# Patient Record
Sex: FEMALE | Race: WHITE | NOT HISPANIC OR LATINO | Employment: FULL TIME | URBAN - METROPOLITAN AREA
[De-identification: names, ages, dates, MRNs, and addresses within clinical notes are randomized per-mention and may not be internally consistent; named-entity substitution may affect disease eponyms.]

---

## 2017-01-20 ENCOUNTER — ALLSCRIPTS OFFICE VISIT (OUTPATIENT)
Dept: OTHER | Facility: OTHER | Age: 22
End: 2017-01-20

## 2017-08-14 ENCOUNTER — TRANSCRIBE ORDERS (OUTPATIENT)
Dept: ADMINISTRATIVE | Facility: HOSPITAL | Age: 22
End: 2017-08-14

## 2017-08-14 ENCOUNTER — APPOINTMENT (OUTPATIENT)
Dept: LAB | Facility: HOSPITAL | Age: 22
End: 2017-08-14
Payer: COMMERCIAL

## 2017-08-14 DIAGNOSIS — Z00.8 HEALTH EXAMINATION IN POPULATION SURVEY: ICD-10-CM

## 2017-08-14 DIAGNOSIS — Z00.8 HEALTH EXAMINATION IN POPULATION SURVEY: Primary | ICD-10-CM

## 2017-08-14 LAB
CHOLEST SERPL-MCNC: 125 MG/DL (ref 50–200)
EST. AVERAGE GLUCOSE BLD GHB EST-MCNC: 91 MG/DL
HBA1C MFR BLD: 4.8 % (ref 4.2–6.3)
HDLC SERPL-MCNC: 74 MG/DL (ref 40–60)
LDLC SERPL CALC-MCNC: 47 MG/DL (ref 0–100)
TRIGL SERPL-MCNC: 22 MG/DL

## 2017-08-14 PROCEDURE — 36415 COLL VENOUS BLD VENIPUNCTURE: CPT

## 2017-08-14 PROCEDURE — 80061 LIPID PANEL: CPT

## 2017-08-14 PROCEDURE — 83036 HEMOGLOBIN GLYCOSYLATED A1C: CPT

## 2017-10-29 ENCOUNTER — APPOINTMENT (OUTPATIENT)
Dept: LAB | Facility: HOSPITAL | Age: 22
End: 2017-10-29
Payer: COMMERCIAL

## 2017-10-29 ENCOUNTER — TRANSCRIBE ORDERS (OUTPATIENT)
Dept: LAB | Facility: HOSPITAL | Age: 22
End: 2017-10-29

## 2017-10-29 DIAGNOSIS — L70.0 NODULAR ELASTOSIS WITH CYSTS AND COMEDONES OF FAVRE AND RACOUCHOT: ICD-10-CM

## 2017-10-29 DIAGNOSIS — Z79.899 NEED FOR PROPHYLACTIC CHEMOTHERAPY: Primary | ICD-10-CM

## 2017-10-29 DIAGNOSIS — L57.8 NODULAR ELASTOSIS WITH CYSTS AND COMEDONES OF FAVRE AND RACOUCHOT: ICD-10-CM

## 2017-10-29 DIAGNOSIS — Z79.899 NEED FOR PROPHYLACTIC CHEMOTHERAPY: ICD-10-CM

## 2017-10-29 LAB
ALBUMIN SERPL BCP-MCNC: 3.9 G/DL (ref 3.5–5)
ALP SERPL-CCNC: 53 U/L (ref 46–116)
ALT SERPL W P-5'-P-CCNC: 25 U/L (ref 12–78)
ANION GAP SERPL CALCULATED.3IONS-SCNC: 6 MMOL/L (ref 4–13)
AST SERPL W P-5'-P-CCNC: 15 U/L (ref 5–45)
B-HCG SERPL-ACNC: <2 MIU/ML
BILIRUB SERPL-MCNC: 0.69 MG/DL (ref 0.2–1)
BUN SERPL-MCNC: 10 MG/DL (ref 5–25)
CALCIUM SERPL-MCNC: 8.5 MG/DL (ref 8.3–10.1)
CHLORIDE SERPL-SCNC: 108 MMOL/L (ref 100–108)
CHOLEST SERPL-MCNC: 142 MG/DL (ref 50–200)
CO2 SERPL-SCNC: 26 MMOL/L (ref 21–32)
CREAT SERPL-MCNC: 0.7 MG/DL (ref 0.6–1.3)
ERYTHROCYTE [DISTWIDTH] IN BLOOD BY AUTOMATED COUNT: 13.1 % (ref 11.6–15.1)
GFR SERPL CREATININE-BSD FRML MDRD: 122 ML/MIN/1.73SQ M
GLUCOSE P FAST SERPL-MCNC: 75 MG/DL (ref 65–99)
HCT VFR BLD AUTO: 38.6 % (ref 34.8–46.1)
HDLC SERPL-MCNC: 78 MG/DL (ref 40–60)
HGB BLD-MCNC: 13 G/DL (ref 11.5–15.4)
LDLC SERPL CALC-MCNC: 53 MG/DL (ref 0–100)
MCH RBC QN AUTO: 27.8 PG (ref 26.8–34.3)
MCHC RBC AUTO-ENTMCNC: 33.7 G/DL (ref 31.4–37.4)
MCV RBC AUTO: 83 FL (ref 82–98)
PLATELET # BLD AUTO: 210 THOUSANDS/UL (ref 149–390)
PMV BLD AUTO: 11.1 FL (ref 8.9–12.7)
POTASSIUM SERPL-SCNC: 3.6 MMOL/L (ref 3.5–5.3)
PROT SERPL-MCNC: 7.1 G/DL (ref 6.4–8.2)
RBC # BLD AUTO: 4.68 MILLION/UL (ref 3.81–5.12)
SODIUM SERPL-SCNC: 140 MMOL/L (ref 136–145)
TRIGL SERPL-MCNC: 55 MG/DL
WBC # BLD AUTO: 7.09 THOUSAND/UL (ref 4.31–10.16)

## 2017-10-29 PROCEDURE — 36415 COLL VENOUS BLD VENIPUNCTURE: CPT

## 2017-10-29 PROCEDURE — 84702 CHORIONIC GONADOTROPIN TEST: CPT

## 2017-10-29 PROCEDURE — 80061 LIPID PANEL: CPT

## 2017-10-29 PROCEDURE — 80053 COMPREHEN METABOLIC PANEL: CPT

## 2017-10-29 PROCEDURE — 85027 COMPLETE CBC AUTOMATED: CPT

## 2017-11-07 ENCOUNTER — GENERIC CONVERSION - ENCOUNTER (OUTPATIENT)
Dept: OTHER | Facility: OTHER | Age: 22
End: 2017-11-07

## 2017-11-08 ENCOUNTER — ALLSCRIPTS OFFICE VISIT (OUTPATIENT)
Dept: OTHER | Facility: OTHER | Age: 22
End: 2017-11-08

## 2017-11-09 NOTE — PROGRESS NOTES
Assessment    1  Bacterial vaginosis (616 10,041 9) (N76 0,B96 89)    Plan  Bacterial vaginosis    · MetroNIDAZOLE 0 75 % Vaginal Gel; INSERT 1 APPLICATORFUL INTRAVAGINALLYAT BEDTIME NIGHTLY   Rx By: Lashon Vinson; Dispense: 5 Days ; #:1 X 70 GM Tube; Refill: 0;Bacterial vaginosis; YOHANA = N; Sent To: Michelle PEREZ    Discussion/Summary  Discussion Summary:   ST JACOBS was advised of the diagnosis of bacterial vaginosis and given an ACOG pamphlet with reference to this  She was informed that I will prescribed Metrogel and was given instructions for use  Chief Complaint  Chief Complaint Free Text Note Form: PATIENT C/O VAGINAL ITCHING AND BURNING X 3 DAYS  History of Present Illness  HPI: ST JACOBS has noted 3 days worth of vaginal itching, burning and discharge  She denies any external lesions, history of STDs, history of new hygiene products, or abnormal bleeding  Review of Systems  Focused-Female:  Constitutional: No fever, no chills, feels well, no tiredness, no recent weight gain or loss  ENT: no ear ache, no loss of hearing, no nosebleeds or nasal discharge, no sore throat or hoarseness  Cardiovascular: no complaints of slow or fast heart rate, no chest pain, no palpitations, no leg claudication or lower extremity edema  Respiratory: no complaints of shortness of breath, no wheezing, no dyspnea on exertion, no orthopnea or PND  Breasts: no complaints of breast pain, breast lump or nipple discharge  Gastrointestinal: no complaints of abdominal pain, no constipation, no nausea or diarrhea, no vomiting, no bloody stools  Genitourinary: no complaints of dysuria, no incontinence, no pelvic pain, no dysmenorrhea, no vaginal discharge or abnormal vaginal bleeding  Musculoskeletal: no complaints of arthralgia, no myalgia, no joint swelling or stiffness, no limb pain or swelling  Integumentary: no complaints of skin rash or lesion, no itching or dry skin, no skin wounds    Neurological: no complaints of headache, no confusion, no numbness or tingling, no dizziness or fainting  Active Problems    1  Contraception, generic surveillance (V25 40) (Z30 40)   2  Dizziness (780 4) (R42)   3  Encounter for annual routine gynecological examination (V72 31) (Z01 419)   4  Encounter for routine gynecological examination with Papanicolaou smear of cervix (V72 31,V76 2) (Z01 419)   5  Denied: History of self breast exam   6  Irregular menses (626 4) (N92 6)   7  Palpitations (785 1) (R00 2)   8  Secondary amenorrhea (626 0) (N91 1)   9  Vertigo (780 4) (R42)    Past Medical History  1  History of  0 (V49 89)   2  Denied: History of self breast exam   3  History of Palpitations (785 1) (R00 2)    Surgical History  1  History of Oral Surgery Tooth Extraction    Family History  Mother    1  Family history of Colon carcinoma  Father    2  Family history of    3  Family history of cerebral aneurysm (V17 1) (Z82 49)   4  Family history of hypertension (V17 49) (Z82 49)    Social History     · Always uses seat belt   · Caffeine use (V49 89) (F15 90)   · Has no children   · Never a smoker   · No drug use   · No Worship beliefs   · Single   · Social drinker (V49 89) (Z78 9)    Current Meds   1  Accutane 40 MG CAPS; Therapy: (Recorded:2017) to Recorded   2  Tri-Lo-Sprintec 0 18/0 215/0 25 MG-25 MCG Oral Tablet; TAKE 1 TABLET DAILY; Therapy: [id number] to ([phone])  Requested for: 09Mid3626; Last Rx:[id number] Ordered    Allergies  1  No Known Drug Allergies    Vitals  Vital Signs    Recorded:  20:88CR   Systolic 146, LUE, Sitting   Diastolic 64, LUE, Sitting   Height 5 ft 1 in   Weight 115 lb 8 oz   BMI Calculated 21 82   BSA Calculated 1 5   LMP 2017       Physical Exam   Constitutional  General appearance: No acute distress, well appearing and well nourished  Genitourinary  External genitalia: Normal and no lesions appreciated     Vagina: Abnormal   Vagina: moderate,-- foul smelling,-- brown-- and-- watery vaginal discharge  Urethra: Normal    Urethral meatus: Normal    Bladder: Normal, soft, non-tender and no prolapse or masses appreciated  Cervix: Normal, no palpable masses  Psychiatric  Orientation to person, place, and time: Normal    Mood and affect: Normal        Procedure  Wet mount shows multiple clue cells      Signatures   Electronically signed by :  Shawn Verma MD; Nov 8 2017 10:20AM EST                       (Author)

## 2017-11-28 ENCOUNTER — TRANSCRIBE ORDERS (OUTPATIENT)
Dept: LAB | Facility: HOSPITAL | Age: 22
End: 2017-11-28

## 2017-11-28 ENCOUNTER — APPOINTMENT (OUTPATIENT)
Dept: LAB | Facility: HOSPITAL | Age: 22
End: 2017-11-28
Payer: COMMERCIAL

## 2017-11-28 DIAGNOSIS — Z79.899 ENCOUNTER FOR LONG-TERM (CURRENT) USE OF MEDICATIONS: Primary | ICD-10-CM

## 2017-11-28 DIAGNOSIS — L70.0 ACNE VULGARIS: ICD-10-CM

## 2017-11-28 DIAGNOSIS — Z79.899 ENCOUNTER FOR LONG-TERM (CURRENT) USE OF MEDICATIONS: ICD-10-CM

## 2017-11-28 LAB
ALBUMIN SERPL BCP-MCNC: 3.8 G/DL (ref 3.5–5)
ALP SERPL-CCNC: 59 U/L (ref 46–116)
ALT SERPL W P-5'-P-CCNC: 27 U/L (ref 12–78)
ANION GAP SERPL CALCULATED.3IONS-SCNC: 6 MMOL/L (ref 4–13)
AST SERPL W P-5'-P-CCNC: 20 U/L (ref 5–45)
B-HCG SERPL-ACNC: <2 MIU/ML
BILIRUB SERPL-MCNC: 0.54 MG/DL (ref 0.2–1)
BUN SERPL-MCNC: 8 MG/DL (ref 5–25)
CALCIUM SERPL-MCNC: 8.7 MG/DL (ref 8.3–10.1)
CHLORIDE SERPL-SCNC: 109 MMOL/L (ref 100–108)
CHOLEST SERPL-MCNC: 170 MG/DL (ref 50–200)
CO2 SERPL-SCNC: 27 MMOL/L (ref 21–32)
CREAT SERPL-MCNC: 0.73 MG/DL (ref 0.6–1.3)
ERYTHROCYTE [DISTWIDTH] IN BLOOD BY AUTOMATED COUNT: 12.9 % (ref 11.6–15.1)
GFR SERPL CREATININE-BSD FRML MDRD: 116 ML/MIN/1.73SQ M
GLUCOSE P FAST SERPL-MCNC: 78 MG/DL (ref 65–99)
HCG SERPL QL: NEGATIVE
HCT VFR BLD AUTO: 39.7 % (ref 34.8–46.1)
HDLC SERPL-MCNC: 67 MG/DL (ref 40–60)
HGB BLD-MCNC: 13.2 G/DL (ref 11.5–15.4)
LDLC SERPL CALC-MCNC: 88 MG/DL (ref 0–100)
MCH RBC QN AUTO: 27.3 PG (ref 26.8–34.3)
MCHC RBC AUTO-ENTMCNC: 33.2 G/DL (ref 31.4–37.4)
MCV RBC AUTO: 82 FL (ref 82–98)
PLATELET # BLD AUTO: 222 THOUSANDS/UL (ref 149–390)
PMV BLD AUTO: 10.8 FL (ref 8.9–12.7)
POTASSIUM SERPL-SCNC: 3.8 MMOL/L (ref 3.5–5.3)
PROT SERPL-MCNC: 7.5 G/DL (ref 6.4–8.2)
RBC # BLD AUTO: 4.83 MILLION/UL (ref 3.81–5.12)
SODIUM SERPL-SCNC: 142 MMOL/L (ref 136–145)
TRIGL SERPL-MCNC: 77 MG/DL
WBC # BLD AUTO: 5.65 THOUSAND/UL (ref 4.31–10.16)

## 2017-11-28 PROCEDURE — 80053 COMPREHEN METABOLIC PANEL: CPT

## 2017-11-28 PROCEDURE — 80061 LIPID PANEL: CPT

## 2017-11-28 PROCEDURE — 84702 CHORIONIC GONADOTROPIN TEST: CPT

## 2017-11-28 PROCEDURE — 85027 COMPLETE CBC AUTOMATED: CPT

## 2017-11-28 PROCEDURE — 36415 COLL VENOUS BLD VENIPUNCTURE: CPT

## 2017-11-28 PROCEDURE — 84703 CHORIONIC GONADOTROPIN ASSAY: CPT

## 2017-12-29 ENCOUNTER — TRANSCRIBE ORDERS (OUTPATIENT)
Dept: LAB | Facility: HOSPITAL | Age: 22
End: 2017-12-29

## 2017-12-29 ENCOUNTER — APPOINTMENT (OUTPATIENT)
Dept: LAB | Facility: HOSPITAL | Age: 22
End: 2017-12-29
Payer: COMMERCIAL

## 2017-12-29 DIAGNOSIS — Z79.899 NEED FOR PROPHYLACTIC CHEMOTHERAPY: ICD-10-CM

## 2017-12-29 DIAGNOSIS — L70.0 NODULAR ELASTOSIS WITH CYSTS AND COMEDONES OF FAVRE AND RACOUCHOT: ICD-10-CM

## 2017-12-29 DIAGNOSIS — Z79.899 NEED FOR PROPHYLACTIC CHEMOTHERAPY: Primary | ICD-10-CM

## 2017-12-29 DIAGNOSIS — L57.8 NODULAR ELASTOSIS WITH CYSTS AND COMEDONES OF FAVRE AND RACOUCHOT: ICD-10-CM

## 2017-12-29 LAB
ALBUMIN SERPL BCP-MCNC: 3.7 G/DL (ref 3.5–5)
ALP SERPL-CCNC: 56 U/L (ref 46–116)
ALT SERPL W P-5'-P-CCNC: 22 U/L (ref 12–78)
ANION GAP SERPL CALCULATED.3IONS-SCNC: 7 MMOL/L (ref 4–13)
AST SERPL W P-5'-P-CCNC: 18 U/L (ref 5–45)
B-HCG SERPL-ACNC: <2 MIU/ML
BILIRUB SERPL-MCNC: 0.53 MG/DL (ref 0.2–1)
BUN SERPL-MCNC: 10 MG/DL (ref 5–25)
CALCIUM SERPL-MCNC: 8.7 MG/DL (ref 8.3–10.1)
CHLORIDE SERPL-SCNC: 107 MMOL/L (ref 100–108)
CHOLEST SERPL-MCNC: 161 MG/DL (ref 50–200)
CO2 SERPL-SCNC: 27 MMOL/L (ref 21–32)
CREAT SERPL-MCNC: 0.71 MG/DL (ref 0.6–1.3)
ERYTHROCYTE [DISTWIDTH] IN BLOOD BY AUTOMATED COUNT: 13.3 % (ref 11.6–15.1)
GFR SERPL CREATININE-BSD FRML MDRD: 120 ML/MIN/1.73SQ M
GLUCOSE P FAST SERPL-MCNC: 74 MG/DL (ref 65–99)
HCT VFR BLD AUTO: 37.6 % (ref 34.8–46.1)
HDLC SERPL-MCNC: 59 MG/DL (ref 40–60)
HGB BLD-MCNC: 12.7 G/DL (ref 11.5–15.4)
LDLC SERPL CALC-MCNC: 88 MG/DL (ref 0–100)
MCH RBC QN AUTO: 27.8 PG (ref 26.8–34.3)
MCHC RBC AUTO-ENTMCNC: 33.8 G/DL (ref 31.4–37.4)
MCV RBC AUTO: 82 FL (ref 82–98)
PLATELET # BLD AUTO: 225 THOUSANDS/UL (ref 149–390)
PMV BLD AUTO: 11 FL (ref 8.9–12.7)
POTASSIUM SERPL-SCNC: 3.5 MMOL/L (ref 3.5–5.3)
PROT SERPL-MCNC: 7.1 G/DL (ref 6.4–8.2)
RBC # BLD AUTO: 4.57 MILLION/UL (ref 3.81–5.12)
SODIUM SERPL-SCNC: 141 MMOL/L (ref 136–145)
TRIGL SERPL-MCNC: 70 MG/DL
WBC # BLD AUTO: 5.38 THOUSAND/UL (ref 4.31–10.16)

## 2017-12-29 PROCEDURE — 84702 CHORIONIC GONADOTROPIN TEST: CPT

## 2017-12-29 PROCEDURE — 85027 COMPLETE CBC AUTOMATED: CPT

## 2017-12-29 PROCEDURE — 80061 LIPID PANEL: CPT

## 2017-12-29 PROCEDURE — 36415 COLL VENOUS BLD VENIPUNCTURE: CPT

## 2017-12-29 PROCEDURE — 80053 COMPREHEN METABOLIC PANEL: CPT

## 2018-01-13 NOTE — RESULT NOTES
Message   Please notify patient that her holter monitor is normal and also get an update on how she is feeling  Verified Results  HOLTER MONITOR - 24 HOUR [id number] 10:39AM Juan Carlos Leary Order Number: [id number]    - Patient Instructions: To schedule this appointment, please contact Central Scheduling at [phone]  For Tushar Muniz, please call [phone]   Order Number: [id number]    - Patient Instructions: To schedule this appointment, please contact Central Scheduling at [phone]  For Tushar Muniz, please call [phone]  Test Name Result Flag Reference   HOLTER MONITOR - 24 HOUR (Report)     24 HOUR HOLTER MONITOR     INDICATION: R/O ARRHYTHMIA     Average heart rate: 83 bpm    Lowest heart rate: 47 bpm   Highest heart rate: 174 bpm     VENTRICULAR ECTOPY - 0 0% of all monitored beats   Total number: 0      SUPRAVENTRICULAR ECTOPY - 0 0% of all monitored beats   Total number: 20    Supraventricular Couplets: 4      BRADYCARDIA   Slowest episode: 6:32 am, (minimum heart rate of 47 bpm)  This corresponded with an ectopic atrial vs junctional bradycardia  There were multiple times the patient went in and out of this rhythm, presumably during sleeping hours  TACHYCARDIA   Fastest episode: occurred at 7:12 pm, (maximum heart rate of 174 bpm)  This corresponded with sinus tachycardia  SYMPTOMS   The patient experienced no significant symptoms during the monitoring time period  1) Normal Holter monitor of 24 hrs duration  2) Normal burden of ventricular and supraventricular ectopic beats  3) Ectopic atrial vs junctional bradycardia during presumed sleep hours  Interpreting Physician: Yuriy Gallo MD, [facility]

## 2018-01-14 VITALS
WEIGHT: 116.25 LBS | HEIGHT: 61 IN | SYSTOLIC BLOOD PRESSURE: 110 MMHG | DIASTOLIC BLOOD PRESSURE: 64 MMHG | BODY MASS INDEX: 21.95 KG/M2

## 2018-01-14 VITALS
DIASTOLIC BLOOD PRESSURE: 64 MMHG | SYSTOLIC BLOOD PRESSURE: 108 MMHG | HEIGHT: 61 IN | WEIGHT: 115.5 LBS | BODY MASS INDEX: 21.81 KG/M2

## 2018-01-17 NOTE — MISCELLANEOUS
Message   Date: 07 Nov 2017 4:03 PM EST, Recorded By: Chelsey Olivares For: Maria De Jesus Stovall   Caller: Katherine Gay, Self   Phone: [phone] (Home), [phone] (Work)   Reason: Medical Complaint   PT HAS A VAGINAL INFECTION  Rafi Jackson NEVER HAD ONE  Rafi Stanley PT WILL SCHEDULE APPT        Active Problems    1  Contraception, generic surveillance (V25 40) (Z30 40)   2  Dizziness (780 4) (R42)   3  Encounter for annual routine gynecological examination (V72 31) (Z01 419)   4  Encounter for routine gynecological examination with Papanicolaou smear of cervix   (V72 31,V76 2) (Z01 419)   5  Denied: History of self breast exam   6  Irregular menses (626 4) (N92 6)   7  Palpitations (785 1) (R00 2)   8  Secondary amenorrhea (626 0) (N91 1)   9  Vertigo (780 4) (R42)    Current Meds   1  Tri-Lo-Sprintec 0 18/0 215/0 25 MG-25 MCG Oral Tablet; TAKE 1 TABLET DAILY; Therapy: [id number] to (Bebo [place])  Requested for: 65Dam8179; Last   Rx:[id number] Ordered    Allergies    1   No Known Drug Allergies    Signatures   Electronically signed by : Damita Osler, ; Nov 7 2017  4:04PM EST                       (Author)

## 2018-01-17 NOTE — MISCELLANEOUS
Message   Recorded as Task   Date: 10/18/2016 03:46 PM, Created By: Sara Johnson   Task Name: Call Patient with results   Assigned To: Sara Johnson   Regarding Patient: Mikael Nassar, Status: Active   Comment: Sara Johnson - 18 Oct 2016 3:46 PM     Patient Phone: [phone]      Please notify patient that her holter monitor is normal and also get an update on how she is feeling  Seble Higuera - 18 Oct 2016 4:12 PM     TASK REASSIGNED: Previously Assigned To Iraj Hernandez - 18 Oct 2016 4:54 PM     TASK REASSIGNED: Previously Assigned To [address] [facility]      Left message   Kyra Shi - 20 Oct 2016 2:26 PM     TASK REPLIED TO: Previously Assigned To [address] [facility]  Patient says she does feel okay, not the best but doesn't feel as bad as before  Cholo Cisneros - 21 Oct 2016 9:04 AM     TASK REPLIED TO: Previously Assigned To Joselin Wilkinson, if she'd like to make an appt to f/u on symptoms please have her do so  Ciera Calix - 21 Oct 2016 3:34 PM     TASK REPLIED TO: Previously Assigned To [address] [facility]   Pt is feeling better  No more vertigo  She will call if she feels like she should come in  Right now she doesn't  Active Problems    1  Dizziness (780 4) (R42)   2  Encounter for contraceptive management (V25 9) (Z30 9)   3  Denied: History of self breast exam   4  Irregular menses (626 4) (N92 6)   5  Palpitations (785 1) (R00 2)   6  Secondary amenorrhea (626 0) (N91 1)    Current Meds   1  Tri-Lo-Sprintec 0 18/0 215/0 25 MG-25 MCG Oral Tablet; TABS PO X 84;   Therapy: 34IDR1090 to (Last Rx:[id number])  Requested for: 86Fsr6572 Ordered    Allergies    1  No Known Drug Allergies    Signatures   Electronically signed by :  Ronie Dancer, CRNP; Oct 21 2016  5:27PM EST                       (Author)

## 2018-01-29 ENCOUNTER — APPOINTMENT (OUTPATIENT)
Dept: LAB | Facility: HOSPITAL | Age: 23
End: 2018-01-29
Payer: COMMERCIAL

## 2018-01-29 ENCOUNTER — TRANSCRIBE ORDERS (OUTPATIENT)
Dept: LAB | Facility: HOSPITAL | Age: 23
End: 2018-01-29

## 2018-01-29 DIAGNOSIS — Z79.899 ENCOUNTER FOR LONG-TERM (CURRENT) USE OF MEDICATIONS: Primary | ICD-10-CM

## 2018-01-29 DIAGNOSIS — L70.0 ACNE VULGARIS: ICD-10-CM

## 2018-01-29 LAB
ALBUMIN SERPL BCP-MCNC: 3.4 G/DL (ref 3.5–5)
ALP SERPL-CCNC: 60 U/L (ref 46–116)
ALT SERPL W P-5'-P-CCNC: 37 U/L (ref 12–78)
ANION GAP SERPL CALCULATED.3IONS-SCNC: 7 MMOL/L (ref 4–13)
AST SERPL W P-5'-P-CCNC: 26 U/L (ref 5–45)
B-HCG SERPL-ACNC: <2 MIU/ML
BILIRUB SERPL-MCNC: 0.55 MG/DL (ref 0.2–1)
BUN SERPL-MCNC: 9 MG/DL (ref 5–25)
CALCIUM SERPL-MCNC: 8.3 MG/DL (ref 8.3–10.1)
CHLORIDE SERPL-SCNC: 108 MMOL/L (ref 100–108)
CHOLEST SERPL-MCNC: 143 MG/DL (ref 50–200)
CO2 SERPL-SCNC: 28 MMOL/L (ref 21–32)
CREAT SERPL-MCNC: 0.62 MG/DL (ref 0.6–1.3)
ERYTHROCYTE [DISTWIDTH] IN BLOOD BY AUTOMATED COUNT: 13.3 % (ref 11.6–15.1)
GFR SERPL CREATININE-BSD FRML MDRD: 127 ML/MIN/1.73SQ M
GLUCOSE P FAST SERPL-MCNC: 73 MG/DL (ref 65–99)
HCT VFR BLD AUTO: 38.5 % (ref 34.8–46.1)
HDLC SERPL-MCNC: 55 MG/DL (ref 40–60)
HGB BLD-MCNC: 13 G/DL (ref 11.5–15.4)
LDLC SERPL CALC-MCNC: 75 MG/DL (ref 0–100)
MCH RBC QN AUTO: 27.7 PG (ref 26.8–34.3)
MCHC RBC AUTO-ENTMCNC: 33.8 G/DL (ref 31.4–37.4)
MCV RBC AUTO: 82 FL (ref 82–98)
PLATELET # BLD AUTO: 276 THOUSANDS/UL (ref 149–390)
PMV BLD AUTO: 10.3 FL (ref 8.9–12.7)
POTASSIUM SERPL-SCNC: 3.6 MMOL/L (ref 3.5–5.3)
PROT SERPL-MCNC: 6.7 G/DL (ref 6.4–8.2)
RBC # BLD AUTO: 4.7 MILLION/UL (ref 3.81–5.12)
SODIUM SERPL-SCNC: 143 MMOL/L (ref 136–145)
TRIGL SERPL-MCNC: 63 MG/DL
WBC # BLD AUTO: 10.5 THOUSAND/UL (ref 4.31–10.16)

## 2018-01-29 PROCEDURE — 84702 CHORIONIC GONADOTROPIN TEST: CPT

## 2018-01-29 PROCEDURE — 36415 COLL VENOUS BLD VENIPUNCTURE: CPT

## 2018-01-29 PROCEDURE — 80053 COMPREHEN METABOLIC PANEL: CPT

## 2018-01-29 PROCEDURE — 80061 LIPID PANEL: CPT

## 2018-01-29 PROCEDURE — 85027 COMPLETE CBC AUTOMATED: CPT

## 2018-01-30 ENCOUNTER — LAB REQUISITION (OUTPATIENT)
Dept: LAB | Facility: HOSPITAL | Age: 23
End: 2018-01-30
Payer: COMMERCIAL

## 2018-01-30 ENCOUNTER — OFFICE VISIT (OUTPATIENT)
Dept: FAMILY MEDICINE CLINIC | Facility: HOSPITAL | Age: 23
End: 2018-01-30
Payer: COMMERCIAL

## 2018-01-30 VITALS
HEART RATE: 70 BPM | SYSTOLIC BLOOD PRESSURE: 120 MMHG | TEMPERATURE: 97 F | HEIGHT: 60 IN | DIASTOLIC BLOOD PRESSURE: 68 MMHG | WEIGHT: 109 LBS | BODY MASS INDEX: 21.4 KG/M2

## 2018-01-30 DIAGNOSIS — R50.9 FEVER AND CHILLS: ICD-10-CM

## 2018-01-30 DIAGNOSIS — R68.89 OTHER GENERAL SYMPTOMS AND SIGNS: ICD-10-CM

## 2018-01-30 DIAGNOSIS — R68.89 FLU-LIKE SYMPTOMS: ICD-10-CM

## 2018-01-30 DIAGNOSIS — J02.9 SORE THROAT: Primary | ICD-10-CM

## 2018-01-30 PROBLEM — L70.0 ACNE VULGARIS: Status: ACTIVE | Noted: 2018-01-30

## 2018-01-30 LAB — S PYO AG THROAT QL: NEGATIVE

## 2018-01-30 PROCEDURE — 99214 OFFICE O/P EST MOD 30 MIN: CPT | Performed by: INTERNAL MEDICINE

## 2018-01-30 PROCEDURE — 87880 STREP A ASSAY W/OPTIC: CPT | Performed by: INTERNAL MEDICINE

## 2018-01-30 PROCEDURE — 87070 CULTURE OTHR SPECIMN AEROBIC: CPT | Performed by: INTERNAL MEDICINE

## 2018-01-30 PROCEDURE — 87147 CULTURE TYPE IMMUNOLOGIC: CPT | Performed by: INTERNAL MEDICINE

## 2018-01-30 PROCEDURE — 87798 DETECT AGENT NOS DNA AMP: CPT | Performed by: INTERNAL MEDICINE

## 2018-01-30 RX ORDER — OSELTAMIVIR PHOSPHATE 75 MG/1
75 CAPSULE ORAL EVERY 12 HOURS SCHEDULED
Qty: 10 CAPSULE | Refills: 0 | Status: SHIPPED | OUTPATIENT
Start: 2018-01-30 | End: 2018-02-04

## 2018-01-30 RX ORDER — ISOTRETINOIN 40 MG/1
1 CAPSULE ORAL 2 TIMES DAILY
COMMUNITY
End: 2018-06-16

## 2018-01-30 RX ORDER — NORGESTIMATE AND ETHINYL ESTRADIOL 7DAYSX3 LO
1 KIT ORAL DAILY
COMMUNITY
Start: 2016-02-23 | End: 2018-02-22 | Stop reason: SDUPTHER

## 2018-01-30 NOTE — PROGRESS NOTES
Assessment/Plan:    No problem-specific Assessment & Plan notes found for this encounter  Diagnoses and all orders for this visit:    Sore throat  -     Throat culture; Future  -     Cancel: Rapid Strep A Screen Throat; Future  -     POCT rapid strepA    Fever and chills  -     Influenza A/B and RSV by PCR; Future    Flu-like symptoms  -     oseltamivir (TAMIFLU) 75 mg capsule; Take 1 capsule (75 mg total) by mouth every 12 (twelve) hours for 5 days  -     Influenza A/B and RSV by PCR; Future      Pt here with ST and flu like symptoms x 2 days - with fever and wide spread flu will start Tamiflu and send for influenza culture to confirm, rapid strep neg but will send for throat culture to ensure negative, no abx unless strep comes back +, gargles/hot tea/lozenges/rest/fluids encouraged, Advised to alternate btw Tylenol and Advil for fever and body aches, call with new/worsening symptoms    D/w pt that likely flu but d/t recent travel if not better or new symptoms occur she needs to call asap for further w/u - pt agreed to do so    Subjective:      Patient ID: Steven Acosta is a 23 y o  female  HPI  Pt here with 2 days of not feeling well  She felt fine  but woke up Monday am with ST  She started to get chills and checked her temp and it was 102 7  She started to feel body aches, Ha's, and nasal congestion (not new since on Accutane)  She notes no cough/ear pain  She was just in [facility] - in [facility] - volunteered in [address]  Did get sick a few days while in [facility] but thought it was d/t altitude  She did have some constipation but that resolved  She notes no urinary symptoms/D/V but she does feel nauseous  She denies rashes/dizziness  She 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 known sick contacts 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   She did get a flu shot  She did take Advil about 2 hrs ago - she did still have temp of 101 3 this am           Review of Systems   Constitutional: Positive for chills, fatigue and fever  HENT: Positive for congestion, rhinorrhea and sore throat  Negative for ear pain  Eyes: Negative for pain and redness  Respiratory: Negative for cough and shortness of breath  Cardiovascular: Negative for chest pain and palpitations  Gastrointestinal: Positive for nausea  Negative for abdominal pain and diarrhea  Genitourinary: Negative for dysuria  Musculoskeletal: Positive for myalgias  Skin: Negative for rash  Neurological: Positive for headaches  Negative for dizziness  Hematological: Does not bruise/bleed easily  Psychiatric/Behavioral: Negative for confusion  Objective:     Physical Exam   Constitutional: She appears well-developed and well-nourished  No distress  HENT:   Head: Normocephalic and atraumatic  Right Ear: External ear normal    Left Ear: External ear normal    Mouth/Throat: No oropharyngeal exudate  + pharyngeal erythema   Eyes: EOM are normal  Pupils are equal, round, and reactive to light  Right eye exhibits no discharge  Left eye exhibits no discharge  Neck: Neck supple  Cardiovascular: Normal rate, regular rhythm and normal heart sounds  Exam reveals no friction rub  No murmur heard  Pulmonary/Chest: Effort normal and breath sounds normal  No respiratory distress  She has no wheezes  She has no rales  Abdominal: Soft  She exhibits no distension  There is no tenderness  There is no guarding  Musculoskeletal: She exhibits no edema  Lymphadenopathy:     She has no cervical adenopathy  Neurological: She is alert  She exhibits normal muscle tone  Skin: Skin is warm  No rash noted  Psychiatric: She has a normal mood and affect   Her behavior is normal

## 2018-01-31 DIAGNOSIS — J02.0 STREP THROAT: Primary | ICD-10-CM

## 2018-01-31 LAB
BACTERIA THROAT CULT: ABNORMAL
FLUAV AG SPEC QL: NORMAL
FLUBV AG SPEC QL: NORMAL
RSV B RNA SPEC QL NAA+PROBE: NORMAL

## 2018-01-31 RX ORDER — AMOXICILLIN AND CLAVULANATE POTASSIUM 875; 125 MG/1; MG/1
1 TABLET, FILM COATED ORAL EVERY 12 HOURS SCHEDULED
Qty: 14 TABLET | Refills: 0 | Status: SHIPPED | OUTPATIENT
Start: 2018-01-31 | End: 2018-02-07

## 2018-02-22 ENCOUNTER — OFFICE VISIT (OUTPATIENT)
Dept: OBGYN CLINIC | Facility: CLINIC | Age: 23
End: 2018-02-22
Payer: COMMERCIAL

## 2018-02-22 VITALS
BODY MASS INDEX: 22.26 KG/M2 | WEIGHT: 113.4 LBS | DIASTOLIC BLOOD PRESSURE: 72 MMHG | SYSTOLIC BLOOD PRESSURE: 112 MMHG | HEIGHT: 60 IN

## 2018-02-22 DIAGNOSIS — Z01.419 ENCOUNTER FOR ANNUAL ROUTINE GYNECOLOGICAL EXAMINATION: ICD-10-CM

## 2018-02-22 DIAGNOSIS — Z12.4 SCREENING FOR CERVICAL CANCER: ICD-10-CM

## 2018-02-22 DIAGNOSIS — Z30.9 ENCOUNTER FOR CONTRACEPTIVE MANAGEMENT, UNSPECIFIED TYPE: ICD-10-CM

## 2018-02-22 DIAGNOSIS — B37.3 VAGINAL CANDIDIASIS: ICD-10-CM

## 2018-02-22 DIAGNOSIS — Z11.3 SCREENING FOR STDS (SEXUALLY TRANSMITTED DISEASES): Primary | ICD-10-CM

## 2018-02-22 DIAGNOSIS — Z11.51 SCREENING FOR HUMAN PAPILLOMAVIRUS (HPV): ICD-10-CM

## 2018-02-22 PROCEDURE — 99395 PREV VISIT EST AGE 18-39: CPT | Performed by: OBSTETRICS & GYNECOLOGY

## 2018-02-22 PROCEDURE — G0145 SCR C/V CYTO,THINLAYER,RESCR: HCPCS | Performed by: OBSTETRICS & GYNECOLOGY

## 2018-02-22 PROCEDURE — 87591 N.GONORRHOEAE DNA AMP PROB: CPT | Performed by: OBSTETRICS & GYNECOLOGY

## 2018-02-22 PROCEDURE — 87491 CHLMYD TRACH DNA AMP PROBE: CPT | Performed by: OBSTETRICS & GYNECOLOGY

## 2018-02-22 PROCEDURE — 87210 SMEAR WET MOUNT SALINE/INK: CPT | Performed by: OBSTETRICS & GYNECOLOGY

## 2018-02-22 RX ORDER — NORGESTIMATE AND ETHINYL ESTRADIOL 7DAYSX3 LO
1 KIT ORAL DAILY
Qty: 84 TABLET | Refills: 4 | Status: SHIPPED | OUTPATIENT
Start: 2018-02-22 | End: 2018-11-07 | Stop reason: SDUPTHER

## 2018-02-22 NOTE — PROGRESS NOTES
Assessment/Plan:    Pap done today  GC and chlamydia also done    Vaginal candidiasis-prescription for Terazol sent, no sign of BV    Contraception-her prescription was renewed, I very strongly encouraged her to be consistent with condom use especially due to the Accutane  She must be using 2 forms of contraception  She understands this  No problem-specific Assessment & Plan notes found for this encounter  Diagnoses and all orders for this visit:    Screening for STDs (sexually transmitted diseases)  -     Chlamydia/GC amplified DNA by PCR    Encounter for annual routine gynecological examination    Encounter for contraceptive management, unspecified type  -     norgestimate-ethinyl estradiol (TRI-LO-SPRINTEC) 0 18/0 215/0 25 MG-25 MCG per tablet; Take 1 tablet by mouth daily    Vaginal candidiasis  -     terconazole (TERAZOL 7) 0 4 % vaginal cream; Insert 1 applicator into the vagina daily at bedtime for 7 days    Screening for human papillomavirus (HPV)  -     Liquid-based pap, screening    Screening for cervical cancer  -     Liquid-based pap, screening          Subjective:      Patient ID: Rex Mehta is a 23 y o  female  Patient here for yearly, she was treated for BV in November  She is now having vaginal discharge and itching, no odor  She is doing well on her tri Lo Sprintec  No further breakthrough bleeding  She has a new partner  She uses condoms but is not always consistent  She is on Accutane for about 2 more months  Gynecologic Exam         The following portions of the patient's history were reviewed and updated as appropriate: allergies, current medications, past family history, past medical history, past social history, past surgical history and problem list     Review of Systems   All other systems reviewed and are negative          Objective:      /72 (BP Location: Right arm, Patient Position: Sitting, Cuff Size: Standard)   Ht 5' (1 524 m)   Wt 51 4 kg (113 lb 6 4 oz)   LMP 02/04/2018 (Exact Date)   BMI 22 15 kg/m²          Physical Exam   Constitutional: She appears well-developed  Neck: No tracheal deviation present  No thyromegaly present  Cardiovascular: Normal rate and regular rhythm  Pulmonary/Chest: Effort normal and breath sounds normal  Right breast exhibits no inverted nipple, no mass, no nipple discharge, no skin change and no tenderness  Left breast exhibits no inverted nipple, no mass, no nipple discharge, no skin change and no tenderness  Breasts are symmetrical    Examined seated and supine   Abdominal: Soft  She exhibits no distension and no mass  There is no tenderness  Genitourinary: Rectum normal, vagina normal and uterus normal  No labial fusion  There is no rash, tenderness, lesion or injury on the right labia  There is no rash, tenderness, lesion or injury on the left labia  Cervix exhibits no motion tenderness, no discharge and no friability  Right adnexum displays no mass, no tenderness and no fullness  Left adnexum displays no mass, no tenderness and no fullness  Genitourinary Comments: White discharge noted   Vitals reviewed

## 2018-02-27 LAB
CHLAMYDIA DNA CVX QL NAA+PROBE: NORMAL
N GONORRHOEA DNA GENITAL QL NAA+PROBE: NORMAL

## 2018-03-01 ENCOUNTER — TRANSCRIBE ORDERS (OUTPATIENT)
Dept: LAB | Facility: HOSPITAL | Age: 23
End: 2018-03-01

## 2018-03-01 ENCOUNTER — APPOINTMENT (OUTPATIENT)
Dept: LAB | Facility: HOSPITAL | Age: 23
End: 2018-03-01
Payer: COMMERCIAL

## 2018-03-01 DIAGNOSIS — L57.8 NODULAR ELASTOSIS WITH CYSTS AND COMEDONES OF FAVRE AND RACOUCHOT: ICD-10-CM

## 2018-03-01 DIAGNOSIS — Z79.899 NEED FOR PROPHYLACTIC CHEMOTHERAPY: ICD-10-CM

## 2018-03-01 DIAGNOSIS — Z79.899 NEED FOR PROPHYLACTIC CHEMOTHERAPY: Primary | ICD-10-CM

## 2018-03-01 DIAGNOSIS — L70.0 NODULAR ELASTOSIS WITH CYSTS AND COMEDONES OF FAVRE AND RACOUCHOT: ICD-10-CM

## 2018-03-01 LAB
ALBUMIN SERPL BCP-MCNC: 3.6 G/DL (ref 3.5–5)
ALP SERPL-CCNC: 64 U/L (ref 46–116)
ALT SERPL W P-5'-P-CCNC: 16 U/L (ref 12–78)
ANION GAP SERPL CALCULATED.3IONS-SCNC: 7 MMOL/L (ref 4–13)
AST SERPL W P-5'-P-CCNC: 14 U/L (ref 5–45)
B-HCG SERPL-ACNC: <2 MIU/ML
BILIRUB SERPL-MCNC: 0.51 MG/DL (ref 0.2–1)
BUN SERPL-MCNC: 6 MG/DL (ref 5–25)
CALCIUM SERPL-MCNC: 8.7 MG/DL (ref 8.3–10.1)
CHLORIDE SERPL-SCNC: 108 MMOL/L (ref 100–108)
CHOLEST SERPL-MCNC: 161 MG/DL (ref 50–200)
CO2 SERPL-SCNC: 26 MMOL/L (ref 21–32)
CREAT SERPL-MCNC: 0.7 MG/DL (ref 0.6–1.3)
ERYTHROCYTE [DISTWIDTH] IN BLOOD BY AUTOMATED COUNT: 13.9 % (ref 11.6–15.1)
GFR SERPL CREATININE-BSD FRML MDRD: 122 ML/MIN/1.73SQ M
GLUCOSE P FAST SERPL-MCNC: 80 MG/DL (ref 65–99)
HCG SERPL QL: NEGATIVE
HCT VFR BLD AUTO: 37.4 % (ref 34.8–46.1)
HDLC SERPL-MCNC: 65 MG/DL (ref 40–60)
HGB BLD-MCNC: 12.5 G/DL (ref 11.5–15.4)
LDLC SERPL CALC-MCNC: 83 MG/DL (ref 0–100)
MCH RBC QN AUTO: 27.4 PG (ref 26.8–34.3)
MCHC RBC AUTO-ENTMCNC: 33.4 G/DL (ref 31.4–37.4)
MCV RBC AUTO: 82 FL (ref 82–98)
PLATELET # BLD AUTO: 222 THOUSANDS/UL (ref 149–390)
PMV BLD AUTO: 10.6 FL (ref 8.9–12.7)
POTASSIUM SERPL-SCNC: 3.5 MMOL/L (ref 3.5–5.3)
PROT SERPL-MCNC: 7.1 G/DL (ref 6.4–8.2)
RBC # BLD AUTO: 4.56 MILLION/UL (ref 3.81–5.12)
SODIUM SERPL-SCNC: 141 MMOL/L (ref 136–145)
TRIGL SERPL-MCNC: 65 MG/DL
WBC # BLD AUTO: 6.4 THOUSAND/UL (ref 4.31–10.16)

## 2018-03-01 PROCEDURE — 36415 COLL VENOUS BLD VENIPUNCTURE: CPT

## 2018-03-01 PROCEDURE — 80061 LIPID PANEL: CPT

## 2018-03-01 PROCEDURE — 85027 COMPLETE CBC AUTOMATED: CPT

## 2018-03-01 PROCEDURE — 84702 CHORIONIC GONADOTROPIN TEST: CPT

## 2018-03-01 PROCEDURE — 80053 COMPREHEN METABOLIC PANEL: CPT

## 2018-03-01 PROCEDURE — 84703 CHORIONIC GONADOTROPIN ASSAY: CPT

## 2018-03-02 LAB
LAB AP GYN PRIMARY INTERPRETATION: NORMAL
Lab: NORMAL
PATH INTERP SPEC-IMP: NORMAL

## 2018-03-05 ENCOUNTER — TELEPHONE (OUTPATIENT)
Dept: OBGYN CLINIC | Facility: CLINIC | Age: 23
End: 2018-03-05

## 2018-03-05 NOTE — TELEPHONE ENCOUNTER
----- Message from Tanika Aldana DO sent at 3/2/2018 12:26 PM EST -----  Normal pap, negative chlamydia and gonorrhea, there was yeast on her pap but she was treated with Terazol so no further treatment needed

## 2018-04-02 ENCOUNTER — APPOINTMENT (OUTPATIENT)
Dept: LAB | Facility: HOSPITAL | Age: 23
End: 2018-04-02
Payer: COMMERCIAL

## 2018-04-02 ENCOUNTER — TRANSCRIBE ORDERS (OUTPATIENT)
Dept: LAB | Facility: HOSPITAL | Age: 23
End: 2018-04-02

## 2018-04-02 DIAGNOSIS — L70.0 NODULAR ELASTOSIS WITH CYSTS AND COMEDONES OF FAVRE AND RACOUCHOT: ICD-10-CM

## 2018-04-02 DIAGNOSIS — Z79.899 NEED FOR PROPHYLACTIC CHEMOTHERAPY: ICD-10-CM

## 2018-04-02 DIAGNOSIS — Z79.899 NEED FOR PROPHYLACTIC CHEMOTHERAPY: Primary | ICD-10-CM

## 2018-04-02 DIAGNOSIS — L57.8 NODULAR ELASTOSIS WITH CYSTS AND COMEDONES OF FAVRE AND RACOUCHOT: ICD-10-CM

## 2018-04-02 LAB
ALBUMIN SERPL BCP-MCNC: 3.5 G/DL (ref 3.5–5)
ALP SERPL-CCNC: 62 U/L (ref 46–116)
ALT SERPL W P-5'-P-CCNC: 15 U/L (ref 12–78)
ANION GAP SERPL CALCULATED.3IONS-SCNC: 6 MMOL/L (ref 4–13)
AST SERPL W P-5'-P-CCNC: 21 U/L (ref 5–45)
B-HCG SERPL-ACNC: <2 MIU/ML
BASOPHILS # BLD AUTO: 0.05 THOUSANDS/ΜL (ref 0–0.1)
BASOPHILS NFR BLD AUTO: 1 % (ref 0–1)
BILIRUB SERPL-MCNC: 0.48 MG/DL (ref 0.2–1)
BUN SERPL-MCNC: 12 MG/DL (ref 5–25)
CALCIUM SERPL-MCNC: 8.3 MG/DL (ref 8.3–10.1)
CHLORIDE SERPL-SCNC: 110 MMOL/L (ref 100–108)
CHOLEST SERPL-MCNC: 184 MG/DL (ref 50–200)
CO2 SERPL-SCNC: 26 MMOL/L (ref 21–32)
CREAT SERPL-MCNC: 0.82 MG/DL (ref 0.6–1.3)
EOSINOPHIL # BLD AUTO: 0.16 THOUSAND/ΜL (ref 0–0.61)
EOSINOPHIL NFR BLD AUTO: 3 % (ref 0–6)
ERYTHROCYTE [DISTWIDTH] IN BLOOD BY AUTOMATED COUNT: 13.8 % (ref 11.6–15.1)
GFR SERPL CREATININE-BSD FRML MDRD: 100 ML/MIN/1.73SQ M
GLUCOSE P FAST SERPL-MCNC: 75 MG/DL (ref 65–99)
HCG SERPL QL: NEGATIVE
HCT VFR BLD AUTO: 40 % (ref 34.8–46.1)
HDLC SERPL-MCNC: 75 MG/DL (ref 40–60)
HGB BLD-MCNC: 13.3 G/DL (ref 11.5–15.4)
LDLC SERPL CALC-MCNC: 94 MG/DL (ref 0–100)
LYMPHOCYTES # BLD AUTO: 2.79 THOUSANDS/ΜL (ref 0.6–4.47)
LYMPHOCYTES NFR BLD AUTO: 45 % (ref 14–44)
MCH RBC QN AUTO: 27.5 PG (ref 26.8–34.3)
MCHC RBC AUTO-ENTMCNC: 33.3 G/DL (ref 31.4–37.4)
MCV RBC AUTO: 83 FL (ref 82–98)
MONOCYTES # BLD AUTO: 0.3 THOUSAND/ΜL (ref 0.17–1.22)
MONOCYTES NFR BLD AUTO: 5 % (ref 4–12)
NEUTROPHILS # BLD AUTO: 2.95 THOUSANDS/ΜL (ref 1.85–7.62)
NEUTS SEG NFR BLD AUTO: 46 % (ref 43–75)
NRBC BLD AUTO-RTO: 0 /100 WBCS
PLATELET # BLD AUTO: 236 THOUSANDS/UL (ref 149–390)
PMV BLD AUTO: 10.6 FL (ref 8.9–12.7)
POTASSIUM SERPL-SCNC: 3.7 MMOL/L (ref 3.5–5.3)
PROT SERPL-MCNC: 7.2 G/DL (ref 6.4–8.2)
RBC # BLD AUTO: 4.83 MILLION/UL (ref 3.81–5.12)
SODIUM SERPL-SCNC: 142 MMOL/L (ref 136–145)
TRIGL SERPL-MCNC: 74 MG/DL
WBC # BLD AUTO: 6.27 THOUSAND/UL (ref 4.31–10.16)

## 2018-04-02 PROCEDURE — 80053 COMPREHEN METABOLIC PANEL: CPT

## 2018-04-02 PROCEDURE — 36415 COLL VENOUS BLD VENIPUNCTURE: CPT

## 2018-04-02 PROCEDURE — 84702 CHORIONIC GONADOTROPIN TEST: CPT

## 2018-04-02 PROCEDURE — 85025 COMPLETE CBC W/AUTO DIFF WBC: CPT

## 2018-04-02 PROCEDURE — 84703 CHORIONIC GONADOTROPIN ASSAY: CPT

## 2018-04-02 PROCEDURE — 80061 LIPID PANEL: CPT

## 2018-04-05 ENCOUNTER — OFFICE VISIT (OUTPATIENT)
Dept: URGENT CARE | Facility: MEDICAL CENTER | Age: 23
End: 2018-04-05
Payer: COMMERCIAL

## 2018-04-05 VITALS
DIASTOLIC BLOOD PRESSURE: 82 MMHG | HEART RATE: 68 BPM | TEMPERATURE: 97.1 F | WEIGHT: 113.8 LBS | HEIGHT: 60 IN | SYSTOLIC BLOOD PRESSURE: 110 MMHG | BODY MASS INDEX: 22.34 KG/M2 | RESPIRATION RATE: 16 BRPM | OXYGEN SATURATION: 99 %

## 2018-04-05 DIAGNOSIS — S76.011A STRAIN OF FLEXOR MUSCLE OF RIGHT HIP, INITIAL ENCOUNTER: Primary | ICD-10-CM

## 2018-04-05 PROCEDURE — 99213 OFFICE O/P EST LOW 20 MIN: CPT | Performed by: FAMILY MEDICINE

## 2018-04-05 PROCEDURE — S9088 SERVICES PROVIDED IN URGENT: HCPCS | Performed by: FAMILY MEDICINE

## 2018-04-05 NOTE — PATIENT INSTRUCTIONS
History and physical suggest right hip flexor strain  Patient given reassurance  I advised to apply he compress as often as needed, continue with NSAIDs such as ibuprofen as needed  She is to perform light stretching exercises  And placing the patient on some modify restriction  If however, symptoms persist beyond 1 week, follow up with primary care provider  She expressed understanding  Hip Sprain   WHAT YOU NEED TO KNOW:   A hip sprain is when a ligament in your hip is stretched or torn  Ligaments (tough tissue that connects bones) surround your hip and hold it in place  DISCHARGE INSTRUCTIONS:   Medicines:   · NSAIDs  help decrease swelling, pain, and fever  This medicine is available with or without a doctor's order  NSAIDs can cause stomach bleeding or kidney problems in certain people  If you take blood thinner medicine, always ask your healthcare provider if NSAIDs are safe for you  Always read the medicine label and follow directions  · Take your medicine as directed  Contact your healthcare provider if you think your medicine is not helping or if you have side effects  Tell him if you are allergic to any medicine  Keep a list of the medicines, vitamins, and herbs you take  Include the amounts, and when and why you take them  Bring the list or the pill bottles to follow-up visits  Carry your medicine list with you in case of an emergency  Follow up with your healthcare provider as directed:  Write down any questions you have so you remember to ask them in your follow-up visits  How to care for your hip sprain:   · Rest  your hip for 2 to 3 days after your injury  This will help decrease the risk of more damage to your hip  · Apply ice  on your hip for 15 to 20 minutes every hour or as directed  Use an ice pack, or put crushed ice in a plastic bag  Cover it with a towel  Ice helps prevent tissue damage and decreases swelling and pain   You may need to apply ice to your hip at least 4 to 8 times each day for the first 48 hours  How to exercise your hip:  After you rest your hip for about 3 days, your healthcare provider may want you to exercise the hip to decrease stiffness  He may also have you start physical therapy (PT)  A physical therapist teaches you light exercises to help decrease pain and swelling and improve hip movement  Once you are able to move your hip without pain, you will be taught exercises to improve your strength  If you have a severe sprain, you may need to wait 1 to 3 weeks before you exercise your hip  How to prevent another injury:  Ask your healthcare provider when you can return to your normal activities  The following can help decrease your risk for sprains:  · Do not exercise when you feel pain or you are tired  · Eat healthy foods  This can help keep your muscles strong  Ask about the best meal plan for you  · Exercise daily  Make sure you warm up and stretch before you exercise  · Wear equipment to protect yourself when you play sports  · Wear shoes that fit well to decrease your risk for falls  · Stop exercising and playing sports if your symptoms from a past injury return  Contact your healthcare provider if:   · You cannot stand on the leg on your injured side  · You have new or increased stiffness or trouble moving your injured hip  · You have new or increased numbness in the leg on your injured side  · You have increased swelling and pain in your hip  · The leg on your injured side looks bluish or pale  · You have questions or concerns about your condition or care  © 2017 [address] [employer] Information is for End User's use only and may not be sold, redistributed or otherwise used for commercial purposes  All illustrations and images included in CareNotes® are the copyrighted property of [employer]  or [address]  The above information is an  only   It is not intended as medical advice for individual conditions or treatments  Talk to your doctor, nurse or pharmacist before following any medical regimen to see if it is safe and effective for you

## 2018-04-05 NOTE — LETTER
April 5, 2018     Patient: Yazan Justice   YOB: 1995   Date of Visit: 4/5/2018       To Whom It May Concern: It is my medical opinion that [place] may return to light duty immediately with the following restrictions: 4/5/2018  Avoid climbing and going downstairs for one-week on this date  Avoid squatting for 1 week  If you have any questions or concerns, please don't hesitate to call           Sincerely,        Siri Goldmann, MD    CC: No Recipients

## 2018-05-03 ENCOUNTER — TELEPHONE (OUTPATIENT)
Dept: OBGYN CLINIC | Facility: HOSPITAL | Age: 23
End: 2018-05-03

## 2018-05-29 DIAGNOSIS — Z30.41 ENCOUNTER FOR SURVEILLANCE OF CONTRACEPTIVE PILLS: Primary | ICD-10-CM

## 2018-05-31 RX ORDER — NORGESTIMATE AND ETHINYL ESTRADIOL 7DAYSX3 LO
1 KIT ORAL DAILY
Qty: 84 TABLET | Refills: 0 | Status: SHIPPED | OUTPATIENT
Start: 2018-05-31 | End: 2018-06-16

## 2018-06-16 ENCOUNTER — HOSPITAL ENCOUNTER (OUTPATIENT)
Dept: RADIOLOGY | Facility: HOSPITAL | Age: 23
Discharge: HOME | End: 2018-06-16
Payer: COMMERCIAL

## 2018-06-16 ENCOUNTER — OFFICE VISIT (OUTPATIENT)
Dept: FAMILY MEDICINE CLINIC | Facility: HOSPITAL | Age: 23
End: 2018-06-16
Payer: COMMERCIAL

## 2018-06-16 VITALS
DIASTOLIC BLOOD PRESSURE: 82 MMHG | BODY MASS INDEX: 22.89 KG/M2 | WEIGHT: 116.6 LBS | HEART RATE: 70 BPM | TEMPERATURE: 98 F | OXYGEN SATURATION: 98 % | RESPIRATION RATE: 12 BRPM | SYSTOLIC BLOOD PRESSURE: 128 MMHG | HEIGHT: 60 IN

## 2018-06-16 DIAGNOSIS — R39.9 UTI SYMPTOMS: ICD-10-CM

## 2018-06-16 DIAGNOSIS — R07.9 CHEST PAIN, UNSPECIFIED TYPE: ICD-10-CM

## 2018-06-16 DIAGNOSIS — R06.02 SOB (SHORTNESS OF BREATH): ICD-10-CM

## 2018-06-16 DIAGNOSIS — R35.0 URINARY FREQUENCY: Primary | ICD-10-CM

## 2018-06-16 LAB
BACTERIA UR QL AUTO: ABNORMAL /HPF
BILIRUB UR QL STRIP: NEGATIVE
CLARITY UR: CLEAR
COLOR UR: YELLOW
GLUCOSE UR STRIP-MCNC: NEGATIVE MG/DL
HGB UR QL STRIP.AUTO: NEGATIVE
HYALINE CASTS #/AREA URNS LPF: ABNORMAL /LPF
KETONES UR STRIP-MCNC: NEGATIVE MG/DL
LEUKOCYTE ESTERASE UR QL STRIP: ABNORMAL
NITRITE UR QL STRIP: NEGATIVE
NON-SQ EPI CELLS URNS QL MICRO: ABNORMAL /HPF
PH UR STRIP.AUTO: 6 [PH] (ref 4.5–8)
PROT UR STRIP-MCNC: NEGATIVE MG/DL
RBC #/AREA URNS AUTO: ABNORMAL /HPF
SL AMB  POCT GLUCOSE, UA: NORMAL
SL AMB LEUKOCYTE ESTERASE,UA: ABNORMAL
SL AMB POCT BILIRUBIN,UA: NEGATIVE
SL AMB POCT BLOOD,UA: ABNORMAL
SL AMB POCT CLARITY,UA: CLEAR
SL AMB POCT COLOR,UA: YELLOW
SL AMB POCT KETONES,UA: NEGATIVE
SL AMB POCT NITRITE,UA: NEGATIVE
SL AMB POCT PH,UA: 5
SL AMB POCT SPECIFIC GRAVITY,UA: 1.01
SL AMB POCT URINE PROTEIN: NEGATIVE
SL AMB POCT UROBILINOGEN: NORMAL
SP GR UR STRIP.AUTO: 1.01 (ref 1–1.03)
UROBILINOGEN UR QL STRIP.AUTO: 0.2 E.U./DL
WBC #/AREA URNS AUTO: ABNORMAL /HPF

## 2018-06-16 PROCEDURE — 93000 ELECTROCARDIOGRAM COMPLETE: CPT | Performed by: NURSE PRACTITIONER

## 2018-06-16 PROCEDURE — 99214 OFFICE O/P EST MOD 30 MIN: CPT | Performed by: NURSE PRACTITIONER

## 2018-06-16 PROCEDURE — 87186 SC STD MICRODIL/AGAR DIL: CPT | Performed by: NURSE PRACTITIONER

## 2018-06-16 PROCEDURE — 81002 URINALYSIS NONAUTO W/O SCOPE: CPT | Performed by: NURSE PRACTITIONER

## 2018-06-16 PROCEDURE — 87086 URINE CULTURE/COLONY COUNT: CPT | Performed by: NURSE PRACTITIONER

## 2018-06-16 PROCEDURE — 3008F BODY MASS INDEX DOCD: CPT | Performed by: NURSE PRACTITIONER

## 2018-06-16 PROCEDURE — 71046 X-RAY EXAM CHEST 2 VIEWS: CPT

## 2018-06-16 PROCEDURE — 87077 CULTURE AEROBIC IDENTIFY: CPT | Performed by: NURSE PRACTITIONER

## 2018-06-16 PROCEDURE — 81001 URINALYSIS AUTO W/SCOPE: CPT | Performed by: NURSE PRACTITIONER

## 2018-06-16 RX ORDER — NITROFURANTOIN 25; 75 MG/1; MG/1
100 CAPSULE ORAL 2 TIMES DAILY
Qty: 10 CAPSULE | Refills: 0 | Status: SHIPPED | OUTPATIENT
Start: 2018-06-16 | End: 2018-06-18 | Stop reason: ALTCHOICE

## 2018-06-16 NOTE — PROGRESS NOTES
Assessment/Plan:     Urine dip positive so will treat presumptively for UTI  Will send urine for UA with reflex to C/S  Instructed to call with no improvement or worsening symptoms  Sob with chest pain for the last 3 months  Low probability for PE    ECG normal in office  '  Will check chest xray and echo  Instructed to go to ED with worsening symptoms  Diagnoses and all orders for this visit:    Urinary frequency  -     Urine culture  -     UA (URINE) with reflex to Microscopic  -     POCT urine dip    UTI symptoms  -     nitrofurantoin (MACROBID) 100 mg capsule; Take 1 capsule (100 mg total) by mouth 2 (two) times a day    SOB (shortness of breath)  -     POCT ECG  -     XR chest pa & lateral; Future  -     Echo complete with contrast if indicated; Future    Chest pain, unspecified type  -     POCT ECG  -     XR chest pa & lateral; Future  -     Echo complete with contrast if indicated; Future          Subjective:     Patient ID: Nasrin Mirza is a 23 y o  female  Burning with urination at end of stream  Last 2 days with increased frequency but that has improved  Has been using clear tract powder that is helping  No blood in urine  Denies fever,chills, abd pain or pelvic pain or pressure  Has back pain that is not any worse than normal  Denies vaginal pain, itching, d/c  Sexually active in monogamous relationship  Denies chance of STD  Was on Accutane  Had sob at that time with nasal congestion  Stopped Accutane  2 months ago  Sob with talking and sometimes at rest  Seems to improve when running  Feels her lungs are more open  Has been going on for the 3 months  Nasal congestion has improved since stopping Accutane but sob persists  Will get pain in chest and back  On OCP  No leg pain or swelling  No cough  Denies h/o lung issues or asthma  Denies personal and family h/o blood clots or clotting issues  Denies h/o cancer, recent immobilization           Review of Systems   Constitutional: Negative for chills, fatigue and fever  Respiratory: Positive for shortness of breath  Negative for cough and wheezing  Cardiovascular: Positive for chest pain  Negative for palpitations and leg swelling  Gastrointestinal: Negative for abdominal pain  Genitourinary: Positive for frequency  Negative for decreased urine volume, difficulty urinating, flank pain, hematuria, pelvic pain, urgency, vaginal discharge and vaginal pain  Musculoskeletal: Negative for back pain  The following portions of the patient's history were reviewed and updated as appropriate: allergies, current medications, past family history, past medical history, past social history, past surgical history and problem list     Objective:  Vitals:    06/16/18 0757   BP: 128/82   Pulse: 70   Resp: 12   Temp: 98 °F (36 7 °C)   SpO2: 98%      Physical Exam   Constitutional: She is oriented to person, place, and time  She appears well-developed and well-nourished  Cardiovascular: Normal rate, regular rhythm and normal heart sounds  Pulmonary/Chest: Effort normal and breath sounds normal    Abdominal: Soft  Bowel sounds are normal  There is hepatosplenomegaly  There is no tenderness  Musculoskeletal: She exhibits no edema  Neurological: She is alert and oriented to person, place, and time  Skin: Skin is warm and dry  Psychiatric: She has a normal mood and affect

## 2018-06-18 DIAGNOSIS — N30.00 ACUTE CYSTITIS WITHOUT HEMATURIA: Primary | ICD-10-CM

## 2018-06-18 LAB — BACTERIA UR CULT: ABNORMAL

## 2018-06-18 RX ORDER — SULFAMETHOXAZOLE AND TRIMETHOPRIM 800; 160 MG/1; MG/1
1 TABLET ORAL EVERY 12 HOURS SCHEDULED
Qty: 10 TABLET | Refills: 0 | Status: SHIPPED | OUTPATIENT
Start: 2018-06-18 | End: 2018-06-23

## 2018-07-19 ENCOUNTER — OFFICE VISIT (OUTPATIENT)
Dept: OBGYN CLINIC | Facility: CLINIC | Age: 23
End: 2018-07-19
Payer: COMMERCIAL

## 2018-07-19 VITALS — WEIGHT: 116.6 LBS | DIASTOLIC BLOOD PRESSURE: 76 MMHG | SYSTOLIC BLOOD PRESSURE: 118 MMHG | BODY MASS INDEX: 22.77 KG/M2

## 2018-07-19 DIAGNOSIS — N72 CERVICITIS: Primary | ICD-10-CM

## 2018-07-19 DIAGNOSIS — Z11.3 SCREENING FOR STD (SEXUALLY TRANSMITTED DISEASE): ICD-10-CM

## 2018-07-19 PROCEDURE — 87591 N.GONORRHOEAE DNA AMP PROB: CPT | Performed by: OBSTETRICS & GYNECOLOGY

## 2018-07-19 PROCEDURE — 99213 OFFICE O/P EST LOW 20 MIN: CPT | Performed by: OBSTETRICS & GYNECOLOGY

## 2018-07-19 PROCEDURE — 87491 CHLMYD TRACH DNA AMP PROBE: CPT | Performed by: OBSTETRICS & GYNECOLOGY

## 2018-07-19 PROCEDURE — 87210 SMEAR WET MOUNT SALINE/INK: CPT | Performed by: OBSTETRICS & GYNECOLOGY

## 2018-07-19 RX ORDER — AZITHROMYCIN 500 MG/1
TABLET, FILM COATED ORAL
Qty: 2 TABLET | Refills: 0 | Status: SHIPPED | OUTPATIENT
Start: 2018-07-19 | End: 2018-07-19

## 2018-07-19 NOTE — PROGRESS NOTES
CC:  Discharge and concern for STDs    HPI: Marisol Morel presents for a several day history of a thick whitish discharge which also causes itching and burning  The patient denies any fever, chills, nausea or vomiting, pelvic pain, abnormal bleeding, dysuria or hematuria, or external lesions  The patient is also requesting a test for gonorrhea and Chlamydia and she recently had a new partner and had some concerns  Past Medical History:  Past Medical History:   Diagnosis Date    Palpitations     last assessed: 7/20/15       Past Surgical History:  Past Surgical History:   Procedure Laterality Date    TOOTH EXTRACTION         Past OB/Gyn History:  Menstrual cycles regular    ALLERGIES: No Known Allergies    MEDS:   Current Outpatient Prescriptions:     norgestimate-ethinyl estradiol (TRI-LO-SPRINTEC) 0 18/0 215/0 25 MG-25 MCG per tablet    Review of Systems:  Skin: No rashes or discolorations of any concern  RESP: Denies SOB, no cough  CV: Denies chest pain or palpitations  Breasts: Denies masses, pain, skin changes and nipple discharge  GI: Denies abdominal pain, heartburn, nausea, vomiting, changes in bowel habits  : Denies dysuria, frequency, CVA tenderness, incontinence and hematuria  Genitalia: Denies external lesions, rashes, pelvic pain, pressure, abnormal bleeding  Positive for the discharge as noted in the HPI  Rectal:  Denies pain, bleeding, hemorrhoids,    Physical Exam:  /76 (BP Location: Left arm, Patient Position: Sitting, Cuff Size: Standard)   Wt 52 9 kg (116 lb 9 6 oz)   LMP 06/23/2018 (Exact Date)   BMI 22 77 kg/m²    Gen: The patient was alert and oriented x3, pleasant well-appearing female in no acute distress  Pelvic  Normal appearing external female genitalia, no visible lesions, no rashes  Vagina reveals a large amount of a whitish-yellow discharge for which a wet mount is obtained   Otherwise there is normal vaginal epithelium, no abnormal  lesions, no evidence of prolapse anteriorly or posteriorly  Normal appearing cervix, mobile and nontender  A culture for GC and chlamydia is obtained  Uterus is normal size, mobile and, nontender  No palpable adnexal masses or tenderness  No anoperineal lesions  Skin:  No concerning lesions  Extremeties: No edema    Wet mount:  There is a very large amount of WBCs, but no evidence of the usual vaginal infections  Assessment & Plan:   1  Cervicitis  Patient was given a prescription for azithromycin 1 g to be taken as a 1 time dose  She will be notified of the outcome of the GC and chlamydia culture

## 2018-07-23 LAB
CHLAMYDIA DNA CVX QL NAA+PROBE: NORMAL
N GONORRHOEA DNA GENITAL QL NAA+PROBE: NORMAL

## 2018-07-31 ENCOUNTER — APPOINTMENT (OUTPATIENT)
Dept: LAB | Facility: HOSPITAL | Age: 23
End: 2018-07-31
Payer: COMMERCIAL

## 2018-07-31 ENCOUNTER — TRANSCRIBE ORDERS (OUTPATIENT)
Dept: LAB | Facility: HOSPITAL | Age: 23
End: 2018-07-31

## 2018-07-31 ENCOUNTER — HOSPITAL ENCOUNTER (OUTPATIENT)
Dept: NON INVASIVE DIAGNOSTICS | Facility: HOSPITAL | Age: 23
Discharge: HOME | End: 2018-07-31
Payer: COMMERCIAL

## 2018-07-31 DIAGNOSIS — R07.9 CHEST PAIN, UNSPECIFIED TYPE: ICD-10-CM

## 2018-07-31 DIAGNOSIS — Z00.8 HEALTH EXAMINATION IN POPULATION SURVEY: Primary | ICD-10-CM

## 2018-07-31 DIAGNOSIS — R06.02 SOB (SHORTNESS OF BREATH): ICD-10-CM

## 2018-07-31 DIAGNOSIS — Z00.8 HEALTH EXAMINATION IN POPULATION SURVEY: ICD-10-CM

## 2018-07-31 LAB
CHOLEST SERPL-MCNC: 151 MG/DL (ref 50–200)
EST. AVERAGE GLUCOSE BLD GHB EST-MCNC: 91 MG/DL
HBA1C MFR BLD: 4.8 % (ref 4.2–6.3)
HDLC SERPL-MCNC: 72 MG/DL (ref 40–60)
LDLC SERPL CALC-MCNC: 68 MG/DL (ref 0–100)
NONHDLC SERPL-MCNC: 79 MG/DL
TRIGL SERPL-MCNC: 57 MG/DL

## 2018-07-31 PROCEDURE — 80061 LIPID PANEL: CPT

## 2018-07-31 PROCEDURE — 93306 TTE W/DOPPLER COMPLETE: CPT | Performed by: INTERNAL MEDICINE

## 2018-07-31 PROCEDURE — 83036 HEMOGLOBIN GLYCOSYLATED A1C: CPT

## 2018-07-31 PROCEDURE — 93306 TTE W/DOPPLER COMPLETE: CPT

## 2018-07-31 PROCEDURE — 36415 COLL VENOUS BLD VENIPUNCTURE: CPT

## 2018-11-07 DIAGNOSIS — Z30.9 ENCOUNTER FOR CONTRACEPTIVE MANAGEMENT, UNSPECIFIED TYPE: ICD-10-CM

## 2018-11-08 RX ORDER — NORGESTIMATE AND ETHINYL ESTRADIOL 7DAYSX3 LO
1 KIT ORAL DAILY
Qty: 84 TABLET | Refills: 0 | Status: SHIPPED | OUTPATIENT
Start: 2018-11-08 | End: 2019-02-04 | Stop reason: SDUPTHER

## 2019-02-04 DIAGNOSIS — Z30.9 ENCOUNTER FOR CONTRACEPTIVE MANAGEMENT, UNSPECIFIED TYPE: ICD-10-CM

## 2019-02-05 RX ORDER — NORGESTIMATE AND ETHINYL ESTRADIOL 7DAYSX3 LO
1 KIT ORAL DAILY
Qty: 84 TABLET | Refills: 0 | Status: SHIPPED | OUTPATIENT
Start: 2019-02-05 | End: 2019-02-28 | Stop reason: SDUPTHER

## 2019-02-28 ENCOUNTER — ANNUAL EXAM (OUTPATIENT)
Dept: OBGYN CLINIC | Facility: CLINIC | Age: 24
End: 2019-02-28
Payer: COMMERCIAL

## 2019-02-28 VITALS
BODY MASS INDEX: 23.64 KG/M2 | WEIGHT: 120.4 LBS | DIASTOLIC BLOOD PRESSURE: 70 MMHG | HEIGHT: 60 IN | SYSTOLIC BLOOD PRESSURE: 106 MMHG

## 2019-02-28 DIAGNOSIS — Z11.3 SCREENING FOR STD (SEXUALLY TRANSMITTED DISEASE): Primary | ICD-10-CM

## 2019-02-28 DIAGNOSIS — Z01.419 ENCOUNTER FOR ANNUAL ROUTINE GYNECOLOGICAL EXAMINATION: ICD-10-CM

## 2019-02-28 DIAGNOSIS — Z30.9 ENCOUNTER FOR CONTRACEPTIVE MANAGEMENT, UNSPECIFIED TYPE: ICD-10-CM

## 2019-02-28 DIAGNOSIS — A64 STD (FEMALE): ICD-10-CM

## 2019-02-28 PROCEDURE — 87591 N.GONORRHOEAE DNA AMP PROB: CPT | Performed by: OBSTETRICS & GYNECOLOGY

## 2019-02-28 PROCEDURE — 99395 PREV VISIT EST AGE 18-39: CPT | Performed by: OBSTETRICS & GYNECOLOGY

## 2019-02-28 PROCEDURE — 87491 CHLMYD TRACH DNA AMP PROBE: CPT | Performed by: OBSTETRICS & GYNECOLOGY

## 2019-02-28 RX ORDER — NORGESTIMATE AND ETHINYL ESTRADIOL 7DAYSX3 LO
1 KIT ORAL DAILY
Qty: 84 TABLET | Refills: 4 | Status: SHIPPED | OUTPATIENT
Start: 2019-02-28

## 2019-02-28 NOTE — PROGRESS NOTES
Assessment/Plan:    Contraception-she will continue tri Lo Sprintec and this was sent to the pharmacy  She uses condoms as well    We discussed regular exercise    No problem-specific Assessment & Plan notes found for this encounter  Diagnoses and all orders for this visit:    Screening for STD (sexually transmitted disease)  -     Chlamydia/GC amplified DNA by PCR    Encounter for annual routine gynecological examination    Encounter for contraceptive management, unspecified type  -     norgestimate-ethinyl estradiol (TRI-LO-SPRINTEC) 0 18/0 215/0 25 MG-25 MCG per tablet; Take 1 tablet by mouth daily Pt is due for an exam in Feb 2019    STD (female)          Subjective:      Patient ID: You Motta is a [place] 37 y o  female  Pt here for yearly  She has no complaints  She is doing well on her generic tri Lo Sprintec  She stopped her Accutane this past year  She does use condoms  She has a new partner  Normal pap in 2018        The following portions of the patient's history were reviewed and updated as appropriate: allergies, current medications, past family history, past medical history, past social history, past surgical history and problem list     Review of Systems   Constitutional: Negative  HENT: Negative  Eyes: Negative  Respiratory: Negative  Cardiovascular: Negative  Gastrointestinal: Negative  Endocrine: Negative  Genitourinary: Negative  Musculoskeletal: Negative  Skin: Negative  Allergic/Immunologic: Negative  Neurological: Negative  Hematological: Negative  Psychiatric/Behavioral: Negative  Objective:      /70 (BP Location: Right arm, Patient Position: Sitting, Cuff Size: Standard)   Ht 5' (1 524 m)   Wt 54 6 kg (120 lb 6 4 oz)   LMP 02/02/2019 (Exact Date)   BMI 23 51 kg/m²          Physical Exam   Constitutional: She appears well-developed  Neck: No tracheal deviation present  No thyromegaly present     Cardiovascular: Normal rate and regular rhythm  Pulmonary/Chest: Effort normal and breath sounds normal  Right breast exhibits no inverted nipple, no mass, no nipple discharge, no skin change and no tenderness  Left breast exhibits no inverted nipple, no mass, no nipple discharge, no skin change and no tenderness  Breasts are symmetrical    Examined seated and supine   Abdominal: Soft  She exhibits no distension and no mass  There is no tenderness  Genitourinary: Vagina normal and uterus normal  No labial fusion  There is no rash, tenderness, lesion or injury on the right labia  There is no rash, tenderness, lesion or injury on the left labia  Cervix exhibits no motion tenderness, no discharge and no friability  Right adnexum displays no mass, no tenderness and no fullness  Left adnexum displays no mass, no tenderness and no fullness  Vitals reviewed

## 2019-03-03 LAB
C TRACH DNA SPEC QL NAA+PROBE: NEGATIVE
N GONORRHOEA DNA SPEC QL NAA+PROBE: NEGATIVE

## 2019-03-28 ENCOUNTER — OFFICE VISIT (OUTPATIENT)
Dept: FAMILY MEDICINE CLINIC | Facility: HOSPITAL | Age: 24
End: 2019-03-28
Payer: COMMERCIAL

## 2019-03-28 VITALS
HEIGHT: 60 IN | BODY MASS INDEX: 23.75 KG/M2 | WEIGHT: 121 LBS | HEART RATE: 73 BPM | DIASTOLIC BLOOD PRESSURE: 82 MMHG | TEMPERATURE: 98 F | SYSTOLIC BLOOD PRESSURE: 128 MMHG

## 2019-03-28 DIAGNOSIS — J01.90 ACUTE NON-RECURRENT SINUSITIS, UNSPECIFIED LOCATION: Primary | ICD-10-CM

## 2019-03-28 PROCEDURE — 1036F TOBACCO NON-USER: CPT | Performed by: INTERNAL MEDICINE

## 2019-03-28 PROCEDURE — 99213 OFFICE O/P EST LOW 20 MIN: CPT | Performed by: INTERNAL MEDICINE

## 2019-03-28 PROCEDURE — 3008F BODY MASS INDEX DOCD: CPT | Performed by: INTERNAL MEDICINE

## 2019-03-28 RX ORDER — FLUTICASONE PROPIONATE 50 MCG
2 SPRAY, SUSPENSION (ML) NASAL DAILY
Qty: 1 BOTTLE | Refills: 1 | Status: SHIPPED | OUTPATIENT
Start: 2019-03-28

## 2019-03-28 RX ORDER — AMOXICILLIN AND CLAVULANATE POTASSIUM 875; 125 MG/1; MG/1
1 TABLET, FILM COATED ORAL EVERY 12 HOURS SCHEDULED
Qty: 14 TABLET | Refills: 0 | Status: SHIPPED | OUTPATIENT
Start: 2019-03-28 | End: 2019-04-04

## 2019-03-28 NOTE — PROGRESS NOTES
Assessment/Plan:       Diagnoses and all orders for this visit:    Acute non-recurrent sinusitis, unspecified location  Comments:  Symptoms persisting and worsening and will therefore start Augmentin, encouarged OTC antihistamine and a rx for Flonase was sent for post nasal drip, rest/fluids/lozenges/hot tea/gargles were encouraged, d/w pt that cough can last 4-6 wks but call with new/worse symptoms or if no better at all by end of abx  Orders:  -     amoxicillin-clavulanate (AUGMENTIN) 875-125 mg per tablet; Take 1 tablet by mouth every 12 (twelve) hours for 7 days  -     fluticasone (FLONASE) 50 mcg/act nasal spray; 2 sprays into each nostril daily          Subjective:      Patient ID: Zamzam Guerrero is a 20 y o  female  HPI Pt here with c/o not feeling well x 1 mo  Symptoms started with nasal congestion about a month ago  Then 2 wks ago she started to note post nasal drip, ST, and HA  She does not feel sinus pain /tooth pain/ear pain  She notes no F/C but does have a cough  She notes the cough is intermittently productive of phlegm  She notes no SOB/wheezing  She notes no rashes/urinary symptoms/body aches  She has been using Dayquil w/o any benefit  Review of Systems   Constitutional: Positive for fatigue  Negative for chills and fever  HENT: Positive for congestion and postnasal drip  Negative for ear pain and sore throat  Eyes: Negative for pain and discharge  Respiratory: Positive for cough  Negative for shortness of breath and wheezing  Gastrointestinal: Negative for diarrhea, nausea and vomiting  Genitourinary: Negative for difficulty urinating and dysuria  Musculoskeletal: Negative for arthralgias and myalgias  Skin: Negative for rash and wound  Neurological: Positive for headaches  Negative for dizziness and light-headedness  Hematological: Negative for adenopathy  Psychiatric/Behavioral: Negative for behavioral problems and confusion           Objective:    /82 (BP Location: Right arm, Patient Position: Sitting, Cuff Size: Standard)   Pulse 73   Temp 98 °F (36 7 °C)   Ht 5' (1 524 m)   Wt 54 9 kg (121 lb)   BMI 23 63 kg/m²      Physical Exam

## 2019-06-10 ENCOUNTER — TELEPHONE (OUTPATIENT)
Dept: FAMILY MEDICINE CLINIC | Facility: HOSPITAL | Age: 24
End: 2019-06-10

## 2019-06-13 ENCOUNTER — OFFICE VISIT (OUTPATIENT)
Dept: FAMILY MEDICINE CLINIC | Facility: HOSPITAL | Age: 24
End: 2019-06-13
Payer: COMMERCIAL

## 2019-06-13 VITALS
DIASTOLIC BLOOD PRESSURE: 86 MMHG | BODY MASS INDEX: 24.03 KG/M2 | WEIGHT: 122.4 LBS | SYSTOLIC BLOOD PRESSURE: 106 MMHG | HEIGHT: 60 IN | TEMPERATURE: 97.4 F | HEART RATE: 88 BPM

## 2019-06-13 DIAGNOSIS — G89.29 CHRONIC NECK PAIN: ICD-10-CM

## 2019-06-13 DIAGNOSIS — G44.229 CHRONIC TENSION-TYPE HEADACHE, NOT INTRACTABLE: Primary | ICD-10-CM

## 2019-06-13 DIAGNOSIS — Z23 ENCOUNTER FOR IMMUNIZATION: ICD-10-CM

## 2019-06-13 DIAGNOSIS — M54.2 CHRONIC NECK PAIN: ICD-10-CM

## 2019-06-13 PROCEDURE — 90471 IMMUNIZATION ADMIN: CPT | Performed by: NURSE PRACTITIONER

## 2019-06-13 PROCEDURE — 90715 TDAP VACCINE 7 YRS/> IM: CPT | Performed by: NURSE PRACTITIONER

## 2019-06-13 PROCEDURE — 99214 OFFICE O/P EST MOD 30 MIN: CPT | Performed by: NURSE PRACTITIONER

## 2019-06-13 PROCEDURE — 3008F BODY MASS INDEX DOCD: CPT | Performed by: NURSE PRACTITIONER

## 2019-06-13 PROCEDURE — 1036F TOBACCO NON-USER: CPT | Performed by: NURSE PRACTITIONER

## 2019-06-13 RX ORDER — CYCLOBENZAPRINE HCL 10 MG
10 TABLET ORAL 3 TIMES DAILY PRN
Qty: 30 TABLET | Refills: 0 | Status: SHIPPED | OUTPATIENT
Start: 2019-06-13

## 2019-07-12 ENCOUNTER — EVALUATION (OUTPATIENT)
Dept: PHYSICAL THERAPY | Facility: REHABILITATION | Age: 24
End: 2019-07-12
Payer: COMMERCIAL

## 2019-07-12 DIAGNOSIS — M54.2 CHRONIC NECK PAIN: ICD-10-CM

## 2019-07-12 DIAGNOSIS — G89.29 CHRONIC NECK PAIN: ICD-10-CM

## 2019-07-12 PROCEDURE — 97110 THERAPEUTIC EXERCISES: CPT | Performed by: PHYSICAL THERAPIST

## 2019-07-12 PROCEDURE — 97161 PT EVAL LOW COMPLEX 20 MIN: CPT | Performed by: PHYSICAL THERAPIST

## 2019-07-12 PROCEDURE — 97140 MANUAL THERAPY 1/> REGIONS: CPT | Performed by: PHYSICAL THERAPIST

## 2019-07-12 NOTE — PROGRESS NOTES
PT Evaluation     Today's date: 2019  Patient name: Luc Peter  : 1995  MRN: [id number]  Referring provider: DEBBIE Herzog  Dx:   Encounter Diagnosis     ICD-10-CM    1  Chronic neck pain M54 2 Ambulatory referral to Physical Therapy    G89 29        Start Time: 910  Stop Time:   Total time in clinic (min): 45 minutes    Assessment  Assessment details: Patient is a 20 y o  female that presents with cervical spine pain and headaches  Patient presents with decreased strength, postural abnormalities, and joint hypomobility  Patient has difficulty with occupational activities secondary to impairments  Patient would benefit from skilled physical therapy services to address impairments to maximize function and reduce impairments  Impairments: abnormal or restricted ROM, activity intolerance, impaired physical strength, lacks appropriate home exercise program and poor posture   Understanding of Dx/Px/POC: good   Prognosis: good    Goals  Impairment:  1  Patient will reports 50% reduction in pain in 4 weeks to maximize function  2   Patient will improve strength to 4/5 in all planes to maximize function  3   Patient will have no headaches related to cervical spine in 4 weeks to return to prior level of function  Functional:  1  Patient will improve FOTO by 5 points to 87/100 in 4 weeks to maximize function  2  Patient will be independent with HEP in 4 weeks to maximize function  3  Patient will report no difficulty with occupational activities in 4 weeks to maximize function  4  Patient will report no difficulty with turning her head or looking downward in 4 weeks to maximize function  Plan  Plan details: Patient will be a RE in 4 weeks    Patient would benefit from: skilled physical therapy  Planned modality interventions: unattended electrical stimulation, TENS and thermotherapy: hydrocollator packs  Planned therapy interventions: manual therapy, patient education, neuromuscular re-education, therapeutic exercise, strengthening, postural training, home exercise program and functional ROM exercises  Frequency: 1x week  Duration in visits: 5  Duration in weeks: 4  Treatment plan discussed with: patient        Subjective Evaluation    History of Present Illness  Mechanism of injury: Patient presents with cervical spine pain and headaches  Patient reports a chronic history of neck pain, but her headaches started about 3 months ago  Patient reports a history of headaches in highschool  Patient reports no change in activity when her headaches started  Patient has most difficulty with looking downward and turning her head, but not looking upwards  Patient had her last headache on Tuesday of this week  Patient feels her pain improves as the day progresses  Patient feels her headaches start in the back of her head and left side of her head  Patient has not noticed a pattern other than it is worst after work  Patient works for the hospital performing bed transfers, taking vitals, etc   Patient will vomit at times when she gets a bad headache  Pain  At best pain ratin  At worst pain rating: 3  Location: headache/neck pain  Quality: tight  Relieving factors: heat  Aggravating factors: lifting  Progression: no change      Diagnostic Tests  No diagnostic tests performed  Treatments  Previous treatment: medication  Current treatment: physical therapy  Patient Goals  Patient goals for therapy: decreased pain  Patient goal: reduce headaches/neck pain        Objective     Static Posture     Head  Forward  Shoulders  Rounded  Scapulae  Left winging, right winging, left anteriorly tipped and right anteriorly tipped  Postural Observations  Seated posture: poor  Standing posture: fair        Palpation   Left   Tenderness of the levator scapulae, middle trapezius, suboccipitals and upper trapezius  Right   No palpable tenderness to the suboccipitals     Tenderness of the levator scapulae, middle trapezius and upper trapezius       Active Range of Motion   Cervical/Thoracic Spine       Cervical    Flexion:  WFL  Extension:  WFL  Left lateral flexion:  WFL  Right lateral flexion:  WFL  Left rotation:  WFL  Right rotation:  Restriction level: minimal  Left Shoulder   Flexion: WFL    Right Shoulder   Flexion: WFL    Passive Range of Motion   Cervical/Thoracic Spine   Normal passive range of motion    Joint Play   Joints within functional limits: C1, C2, C3, C4, C5, C6 and C7     Hypomobile: T1, T2, T3, T4, T5 and T6     Pain: T1, T2, T3, T4, T5 and T6     Strength/Myotome Testing     Left Shoulder     Planes of Motion   Flexion: 4-   Abduction: 4-     Isolated Muscles   Lower trapezius: 4-   Middle trapezius: 4-     Right Shoulder     Planes of Motion   Flexion: 4   Abduction: 4-     Isolated Muscles   Lower trapezius: 4-   Middle trapezius: 4-     Tests   Cervical   Positive neck flexor muscle endurance test   Negative alar ligament test        Flowsheet Rows      Most Recent Value   PT/OT G-Codes   Current Score  82   Projected Score  87             Precautions: HA      Manual  7/12            Suboccipital release 10 min PRN                                                                   Exercise Diary  7/12            UBE rev             Scapular retraction 10" 10x            Cervical retraction 5" 10x            Deep neck flexion supine 5" 5x            Supine serratus punches 5" 10x            Prone T, W, Y             Row with band             Shoulder extension with band             Body mechanics training                                                                                                                                                                Modalities              Heat PRN

## 2019-07-18 ENCOUNTER — APPOINTMENT (OUTPATIENT)
Dept: PHYSICAL THERAPY | Facility: REHABILITATION | Age: 24
End: 2019-07-18
Payer: COMMERCIAL

## 2019-07-23 ENCOUNTER — OFFICE VISIT (OUTPATIENT)
Dept: PHYSICAL THERAPY | Facility: REHABILITATION | Age: 24
End: 2019-07-23
Payer: COMMERCIAL

## 2019-07-23 DIAGNOSIS — G89.29 CHRONIC NECK PAIN: Primary | ICD-10-CM

## 2019-07-23 DIAGNOSIS — M54.2 CHRONIC NECK PAIN: Primary | ICD-10-CM

## 2019-07-23 PROCEDURE — 97530 THERAPEUTIC ACTIVITIES: CPT | Performed by: PHYSICAL THERAPIST

## 2019-07-23 PROCEDURE — 97110 THERAPEUTIC EXERCISES: CPT | Performed by: PHYSICAL THERAPIST

## 2019-07-23 NOTE — PROGRESS NOTES
Daily Note     Today's date: 2019  Patient name: Anastasiia Queen  : 1995  MRN: [id number]  Referring provider: DEBBIE Benítez  Dx:   Encounter Diagnosis     ICD-10-CM    1  Chronic neck pain M54 2     G89 29        Start Time:   Stop Time: 915  Total time in clinic (min): 40 minutes    Subjective: Patient presents for first visit post IE  Patient reports that she is doing well and has no headache or pain this morning  She has no had a headache for over a week  She reports occasional compliance with HEP  Objective: See treatment diary below  Precautions: HA        Manual                     Suboccipital release 10 min 5 min                                                                                                                         Exercise Diary                     UBE rev    5 min                   Scapular retraction 10" 10x  HEP                   Cervical retraction 5" 10x  HEP                   Deep neck flexion supine 5" 5x  5" 10x                   Supine serratus punches 5" 10x 3 lbs 5" 15x                   Prone T, W, Y    5" 5x each                   Row with band   5" 20x green blue                 Shoulder extension with band    5" 20x red  green                 Body mechanics training    10 min  d/c                  supinated ER with band    NV                                                                                                                                                                                                                                                                         Modalities                        Heat PRN                                                                        Assessment: Tolerated treatment well  Patient exhibited good technique with therapeutic exercises and would benefit from continued PT  Patient tolerates all progressions and additional therex well this visit    Patient will call to schedule when she knows her schedule next week  Progress as noted  She demonstrates good technique with squatting and body mechanics  Plan: Potential discharge next visit  Discharge to HEP next visit if continues to be pain free

## 2019-07-30 ENCOUNTER — APPOINTMENT (OUTPATIENT)
Dept: PHYSICAL THERAPY | Facility: REHABILITATION | Age: 24
End: 2019-07-30
Payer: COMMERCIAL

## 2019-08-02 ENCOUNTER — OFFICE VISIT (OUTPATIENT)
Dept: PHYSICAL THERAPY | Facility: REHABILITATION | Age: 24
End: 2019-08-02
Payer: COMMERCIAL

## 2019-08-02 DIAGNOSIS — G89.29 CHRONIC NECK PAIN: Primary | ICD-10-CM

## 2019-08-02 DIAGNOSIS — M54.2 CHRONIC NECK PAIN: Primary | ICD-10-CM

## 2019-08-02 PROCEDURE — 97110 THERAPEUTIC EXERCISES: CPT

## 2019-08-02 NOTE — PROGRESS NOTES
8/2/2019:  Patient reports significant improvement overall with symptoms  FOTO improved by 5 points to 87/100 to meet goal   No formal measurements taken  See goals below  Impairment:  1  Patient will reports 50% reduction in pain in 4 weeks to maximize function  -MET  2  Patient will improve strength to 4/5 in all planes to maximize function  -NOT ASSESSED  3  Patient will have no headaches related to cervical spine in 4 weeks to return to prior level of function  -PARTIALLY MET    Functional:  1  Patient will improve FOTO by 5 points to 87/100 in 4 weeks to maximize function  -MET  2  Patient will be independent with HEP in 4 weeks to maximize function  -MET  3  Patient will report no difficulty with occupational activities in 4 weeks to maximize function  -MET  4   Patient will report no difficulty with turning her head or looking downward in 4 weeks to maximize function-MET

## 2019-08-02 NOTE — PROGRESS NOTES
Daily Note     Today's date: 2019  Patient name: Marisol Morel  : 1995  MRN: [id number]  Referring provider: Donis Saint, CRNP  Dx:   Encounter Diagnosis     ICD-10-CM    1  Chronic neck pain M54 2     G89 29        Start Time: 945  Stop Time:   Total time in clinic (min): 30 minutes    Subjective: Patient reports good response to last treatment with no soreness, reports having a knot in L UT  Pt reports having 1 headache since last visit, lasted approx 1 hour in which she took an Excedrin and HA diminished  Pt reports compliance with HEP as able  Objective: See treatment diary below  Precautions: HA        Manual                   Suboccipital release 10 min 5 min  5 min                                                                                                                       Exercise Diary                   UBE rev    5 min  5 min                 Scapular retraction 10" 10x  HEP  HEP                 Cervical retraction 5" 10x  HEP  HEP                 Deep neck flexion supine 5" 5x  5" 10x  5"x10                 Supine serratus punches 5" 10x 3 lbs 5" 15x  3# 5"x20                 Prone T, W, Y    5" 5x each  np                 Row with band   5" 20x green Blue 5"x15                 Shoulder extension with band    5" 20x red  green 5"x15                 Body mechanics training    10 min  d/c                  supinated ER with band    NV  yellow 5"x10                                                                                                                                                                                                                                                                       Modalities                        Heat PRN                                                                        Assessment: Tolerated treatment well  Patient demonstrates good form with TE with minimal cues for form    Tolerated addition of supinated ER with band with good challenge, no increase in sx's present  Minimal suboccipital tightness present this visit  Patient reports significant improvement since beginning therapy, scores an 87 on foto today  Pt was issued updated HEP including TB, verbalizes understanding and will be DC'd at this time  Patient demonstrated fatigue post treatment  Plan: Patient will be discharged at this time to transition to HEP

## 2019-08-21 ENCOUNTER — OFFICE VISIT (OUTPATIENT)
Dept: OBGYN CLINIC | Facility: CLINIC | Age: 24
End: 2019-08-21
Payer: COMMERCIAL

## 2019-08-21 VITALS
HEIGHT: 60 IN | WEIGHT: 118 LBS | SYSTOLIC BLOOD PRESSURE: 118 MMHG | DIASTOLIC BLOOD PRESSURE: 70 MMHG | BODY MASS INDEX: 23.16 KG/M2

## 2019-08-21 DIAGNOSIS — N76.1 SUBACUTE VAGINITIS: Primary | ICD-10-CM

## 2019-08-21 PROCEDURE — 87660 TRICHOMONAS VAGIN DIR PROBE: CPT | Performed by: OBSTETRICS & GYNECOLOGY

## 2019-08-21 PROCEDURE — 87480 CANDIDA DNA DIR PROBE: CPT | Performed by: OBSTETRICS & GYNECOLOGY

## 2019-08-21 PROCEDURE — 87510 GARDNER VAG DNA DIR PROBE: CPT | Performed by: OBSTETRICS & GYNECOLOGY

## 2019-08-21 PROCEDURE — 99213 OFFICE O/P EST LOW 20 MIN: CPT | Performed by: OBSTETRICS & GYNECOLOGY

## 2019-08-21 NOTE — PROGRESS NOTES
Assessment/Plan:      There are no diagnoses linked to this encounter  Subjective:     Patient ID: Clarice Motta is a 30 y o  female  [facility] is here wanting to be checked for the possibility of yeast or bacterial vaginosis  She and her significant other had relations and shortly afterwards he was complaining of itching and burning  She was fully evaluated by the family physician with negative STD screening  His family physician suggested that it could be yeast or BV in the patient while not having any symptoms such as discharge, itching or burning wishes to be checked  Review of Systems    Noncontributory  Objective:     Physical Exam   Normal appearing external genitalia without swelling or erythema  Vagina appears normal although the patient is midway through her cycles so it is difficult to evaluate  Cervix is grossly normal mobile and nontender uterus anteverted normal size shape with no adnexal masses or tenderness  An affirm test is obtained

## 2019-08-22 LAB
CANDIDA RRNA VAG QL PROBE: NEGATIVE
G VAGINALIS RRNA GENITAL QL PROBE: POSITIVE
T VAGINALIS RRNA GENITAL QL PROBE: NEGATIVE

## 2019-08-23 ENCOUNTER — TELEPHONE (OUTPATIENT)
Dept: OBGYN CLINIC | Facility: CLINIC | Age: 24
End: 2019-08-23

## 2019-08-23 NOTE — TELEPHONE ENCOUNTER
----- Message from Celestine Wilkinson MD sent at 8/23/2019  8:19 AM EDT -----  Pt tested positive for BV    Please send script for Metrogel, 1 applicator vaginally at bedtime for 5 nights

## 2019-08-23 NOTE — TELEPHONE ENCOUNTER
Patient called back to see if BV is an STD  States her partner is having burning with urination    Advised not an STD but partner should definitely be checked for his sx  By PCP or Urologist